# Patient Record
Sex: FEMALE | Race: WHITE | NOT HISPANIC OR LATINO | URBAN - NONMETROPOLITAN AREA
[De-identification: names, ages, dates, MRNs, and addresses within clinical notes are randomized per-mention and may not be internally consistent; named-entity substitution may affect disease eponyms.]

---

## 2024-07-23 ENCOUNTER — APPOINTMENT (RX ONLY)
Dept: URBAN - NONMETROPOLITAN AREA CLINIC 2 | Facility: CLINIC | Age: 54
Setting detail: DERMATOLOGY
End: 2024-07-23

## 2024-07-23 DIAGNOSIS — Z41.9 ENCOUNTER FOR PROCEDURE FOR PURPOSES OTHER THAN REMEDYING HEALTH STATE, UNSPECIFIED: ICD-10-CM

## 2024-07-23 PROCEDURE — ? COSMETIC CONSULTATION: CHEMICAL PEELS

## 2024-07-23 PROCEDURE — ? FACIAL

## 2024-07-23 PROCEDURE — ? COSMETIC CONSULTATION: BBL

## 2024-07-23 ASSESSMENT — LOCATION SIMPLE DESCRIPTION DERM
LOCATION SIMPLE: LEFT FOREHEAD
LOCATION SIMPLE: LEFT CHEEK
LOCATION SIMPLE: RIGHT CHEEK
LOCATION SIMPLE: LEFT LIP

## 2024-07-23 ASSESSMENT — LOCATION DETAILED DESCRIPTION DERM
LOCATION DETAILED: LEFT LOWER CUTANEOUS LIP
LOCATION DETAILED: LEFT INFERIOR CENTRAL MALAR CHEEK
LOCATION DETAILED: LEFT INFERIOR MEDIAL FOREHEAD
LOCATION DETAILED: RIGHT INFERIOR MEDIAL MALAR CHEEK

## 2024-07-23 ASSESSMENT — LOCATION ZONE DERM
LOCATION ZONE: LIP
LOCATION ZONE: FACE

## 2024-07-23 NOTE — PROCEDURE: FACIAL
Exfoliation Type: enzyme
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  She would also like to improve her texture, tone, and brightness of her skin so that she no longer needs to wear makeup.  \\n*I advised doing a pkg including BBL FY or rosacea and light, with perhaps a couple of TCA peels.  We will discuss the pkg further at her next appointment as pt purchased 2 regular facials today for the July BOGO.\\n*Pt does not spend a lot of time in the sun, but she doesn’t like a lot of steps to her skincare regimen.  She already uses Elta MD sunscreen and I gave samples of Triple lipid (pt has dry skin), as well as CE ferulic and Alpharet to get on a better regimen without too many steps.
Treatment Type (Optional): Acne Facial
Detail Level: Zone